# Patient Record
Sex: MALE | Race: BLACK OR AFRICAN AMERICAN | NOT HISPANIC OR LATINO | Employment: UNEMPLOYED | URBAN - METROPOLITAN AREA
[De-identification: names, ages, dates, MRNs, and addresses within clinical notes are randomized per-mention and may not be internally consistent; named-entity substitution may affect disease eponyms.]

---

## 2022-03-18 ENCOUNTER — HOSPITAL ENCOUNTER (EMERGENCY)
Facility: HOSPITAL | Age: 37
Discharge: HOME | End: 2022-03-18
Attending: EMERGENCY MEDICINE

## 2022-03-18 VITALS
BODY MASS INDEX: 20.83 KG/M2 | OXYGEN SATURATION: 96 % | WEIGHT: 125 LBS | RESPIRATION RATE: 14 BRPM | DIASTOLIC BLOOD PRESSURE: 86 MMHG | TEMPERATURE: 98 F | HEIGHT: 65 IN | HEART RATE: 76 BPM | SYSTOLIC BLOOD PRESSURE: 118 MMHG

## 2022-03-18 DIAGNOSIS — J45.901 MODERATE ASTHMA WITH EXACERBATION, UNSPECIFIED WHETHER PERSISTENT: Primary | ICD-10-CM

## 2022-03-18 PROCEDURE — 99284 EMERGENCY DEPT VISIT MOD MDM: CPT | Mod: 25

## 2022-03-18 PROCEDURE — 63600175 PHARM REV CODE 636 W HCPCS: Performed by: EMERGENCY MEDICINE

## 2022-03-18 PROCEDURE — 94640 AIRWAY INHALATION TREATMENT: CPT

## 2022-03-18 PROCEDURE — 94760 N-INVAS EAR/PLS OXIMETRY 1: CPT

## 2022-03-18 PROCEDURE — 25000242 PHARM REV CODE 250 ALT 637 W/ HCPCS: Performed by: EMERGENCY MEDICINE

## 2022-03-18 RX ORDER — PREDNISONE 20 MG/1
40 TABLET ORAL DAILY
Qty: 12 TABLET | Refills: 0 | Status: SHIPPED | OUTPATIENT
Start: 2022-03-18 | End: 2022-03-22

## 2022-03-18 RX ORDER — ALBUTEROL SULFATE 90 UG/1
2 AEROSOL, METERED RESPIRATORY (INHALATION) ONCE
Status: COMPLETED | OUTPATIENT
Start: 2022-03-18 | End: 2022-03-18

## 2022-03-18 RX ORDER — PREDNISONE 20 MG/1
60 TABLET ORAL
Status: COMPLETED | OUTPATIENT
Start: 2022-03-18 | End: 2022-03-18

## 2022-03-18 RX ORDER — ALBUTEROL SULFATE 90 UG/1
2 AEROSOL, METERED RESPIRATORY (INHALATION) EVERY 4 HOURS PRN
Qty: 6.7 G | Refills: 1 | Status: SHIPPED | OUTPATIENT
Start: 2022-03-18 | End: 2023-03-18

## 2022-03-18 RX ADMIN — PREDNISONE 60 MG: 20 TABLET ORAL at 05:03

## 2022-03-18 RX ADMIN — ALBUTEROL SULFATE 2 PUFF: 90 AEROSOL, METERED RESPIRATORY (INHALATION) at 05:03

## 2022-03-18 NOTE — ED NOTES
Discharge instructions explained along with prescriptions. Lungs still sound coarse after breathing treatment. Patient noted to be 92% on RA while sleeping . 94% once asked to wake up and cough. States he does feel better. Will continue to monitor before discharge.

## 2022-03-18 NOTE — ED TRIAGE NOTES
Patient brought in by EMS for complaints of SOB and wheezing that woke him out of his sleep. Expiratory wheezing heard with auscultation. Patient was initially sating 88% when EMS arrived. Breathing tx given en route. Patient now 94% on RA. States he does feels better. Reports this happening 2 weeks ago. EMS had arrived to house but he refused to go to hospital. When asked about asthma history he stated both parents have asthma. Patient denies any medical history.     Review of patient's allergies indicates:  No Known Allergies     Patient has verified the spelling of their name and  on armband.   APPEARANCE: Patient is alert, calm, oriented x 4, and does not appear distressed.  SKIN: Skin is normal for race, warm, and dry. Normal skin turgor and mucous membranes moist.  CARDIAC: Normal rate and rhythm, no murmur heard. Denies chest pain  RESPIRATORY:Normal rate and effort. +expiratory wheezing heard with auscultation Respirations are equal and unlabored.    : Voids without complication

## 2022-03-18 NOTE — ED PROVIDER NOTES
Encounter Date: 3/18/2022       History     Chief Complaint   Patient presents with    Shortness of Breath     Patient presents to the ED with reports of having woken up with wheezing and feeling short of breath. EMS states room air oxygen saturation was 88%. Given 5 of albuterol and 500 of atrovent prior to arrival.      Patient awake and just prior to arrival feeling extremely short of breath and wheezing.  On EMS arrival the patient reportedly had a room air pulse ox of 88%.  Was given a DuoNeb and feels much better.  Given the choice of how much better he feels, he states that he feels 75% better.  No history of asthma however he did have a similar event about a month ago but got better and did go to the hospital.  He has never used an inhaler before any medications for asthma.  Is not vaccinated against COVID-19 but has no headache, fever, sore throat, chest pain, muscle aches, nausea, vomiting or any other symptoms of COVID-19.  No obvious exposure to new potential allergens such as CAT dander etcetera        Review of patient's allergies indicates:  No Known Allergies  No past medical history on file.  No past surgical history on file.  No family history on file.     Review of Systems   Constitutional: Negative for fever.   HENT: Negative for sore throat.    Respiratory: Positive for shortness of breath and wheezing.    Cardiovascular: Negative for chest pain.   Gastrointestinal: Negative for nausea.   Musculoskeletal: Negative for back pain.   Skin: Negative for rash.   Allergic/Immunologic: Negative for immunocompromised state.   Neurological: Negative for weakness.   Hematological: Does not bruise/bleed easily.       Physical Exam     Initial Vitals   BP Pulse Resp Temp SpO2   03/18/22 0451 03/18/22 0451 03/18/22 0457 03/18/22 0457 03/18/22 0450   (!) 134/99 86 20 98.1 °F (36.7 °C) 100 %      MAP       --                Physical Exam    Nursing note and vitals reviewed.  Constitutional: He appears  well-developed and well-nourished. He is not diaphoretic. No distress.   HENT:   Head: Normocephalic and atraumatic.   Eyes: Conjunctivae and EOM are normal. No scleral icterus.   Neck: Neck supple.   Cardiovascular: Normal rate, regular rhythm and normal heart sounds.   No murmur heard.  Pulmonary/Chest: No respiratory distress. He has wheezes.   Diffuse anterior and posterior expiratory wheezes.  Good air movement.  No accessory muscle use.  No tachypnea.  No nasal flaring or grunting.  No respiratory distress.   Abdominal: Abdomen is soft. He exhibits no distension. There is no abdominal tenderness. There is no rebound.   Musculoskeletal:         General: Normal range of motion.      Cervical back: Neck supple.     Neurological: He is alert and oriented to person, place, and time. He has normal strength.   Skin: Skin is warm and dry. No rash noted.   Psychiatric: He has a normal mood and affect.         ED Course   Procedures  Labs Reviewed - No data to display       Imaging Results    None          Medications   albuterol inhaler 2 puff (2 puffs Inhalation Given 3/18/22 0504)   predniSONE tablet 60 mg (60 mg Oral Given 3/18/22 0502)     Medical Decision Making:   Initial Assessment:   Patient with bronchospasm and likely has developed asthma.  Given DuoNeb and feels much better.  We will have Respiratory give him an albuterol inhaler treatment and teach the patient how to use the inhaler and do this with a spacer.  Have passed that the inhaler be given to the patient to go with the spacer and will give the patient a prescription for albuterol inhaler.  Will also observe him in the ED for little bit to make sure he maintains a good pulse ox and air movement and give him a prescription for prednisone 60 mg every morning for the next 4 days.  ED Management:  Re-evaluation at 5:25 a.m.:  Patient is sleeping comfortably.  Still some end expiratory wheezes but stable for discharge.  Given an albuterol inhaler with  spacer treatment and teaching by respiratory therapy here and given the inhaler and spacer to go.  Will also give a prescription for an inhaler which will be filled here before the family and patient go back to [place].                      Clinical Impression:   Final diagnoses:  [J45.901] Moderate asthma with exacerbation, unspecified whether persistent (Primary)          ED Disposition Condition    Discharge Stable        ED Prescriptions     Medication Sig Dispense Start Date End Date Auth. Provider    albuterol (PROVENTIL/VENTOLIN HFA) 90 mcg/actuation inhaler Inhale 2 puffs into the lungs every 4 (four) hours as needed for Wheezing. Rescue 6.7 g 3/18/2022 3/18/2023 Jesus Manuel Bahena MD    predniSONE (DELTASONE) 20 MG tablet Take 2 tablets (40 mg total) by mouth once daily. for 4 days 12 tablet 3/18/2022 3/22/2022 Jesus Manuel Bahena MD        Follow-up Information     Follow up With Specialties Details Why Contact Info Additional Information    [facility] Schedule an appointment as soon as possible for a visit   [address], [address]  [facility] [address] [phone] Please park in [address] or [address] and use [address]. Take Medical Office Bldg. elevators.           Jesus Manuel Bahena MD  03/18/22 5401